# Patient Record
Sex: MALE | Race: WHITE | NOT HISPANIC OR LATINO
[De-identification: names, ages, dates, MRNs, and addresses within clinical notes are randomized per-mention and may not be internally consistent; named-entity substitution may affect disease eponyms.]

---

## 2023-02-24 PROBLEM — Z00.00 ENCOUNTER FOR PREVENTIVE HEALTH EXAMINATION: Status: ACTIVE | Noted: 2023-02-24

## 2023-02-27 ENCOUNTER — RESULT CHARGE (OUTPATIENT)
Age: 73
End: 2023-02-27

## 2023-02-27 ENCOUNTER — APPOINTMENT (OUTPATIENT)
Dept: CARDIOLOGY | Facility: CLINIC | Age: 73
End: 2023-02-27
Payer: MEDICARE

## 2023-02-27 VITALS
HEART RATE: 86 BPM | RESPIRATION RATE: 14 BRPM | DIASTOLIC BLOOD PRESSURE: 84 MMHG | HEIGHT: 68 IN | BODY MASS INDEX: 32.89 KG/M2 | TEMPERATURE: 97.8 F | WEIGHT: 217 LBS | SYSTOLIC BLOOD PRESSURE: 130 MMHG

## 2023-02-27 DIAGNOSIS — Z87.891 PERSONAL HISTORY OF NICOTINE DEPENDENCE: ICD-10-CM

## 2023-02-27 DIAGNOSIS — Z78.9 OTHER SPECIFIED HEALTH STATUS: ICD-10-CM

## 2023-02-27 DIAGNOSIS — Z86.79 PERSONAL HISTORY OF OTHER DISEASES OF THE CIRCULATORY SYSTEM: ICD-10-CM

## 2023-02-27 PROCEDURE — 99204 OFFICE O/P NEW MOD 45 MIN: CPT

## 2023-02-27 PROCEDURE — 93000 ELECTROCARDIOGRAM COMPLETE: CPT

## 2023-02-27 RX ORDER — CHOLECALCIFEROL (VITAMIN D3) 125 MCG
50 MCG TABLET ORAL DAILY
Refills: 0 | Status: ACTIVE | COMMUNITY

## 2023-02-27 RX ORDER — MECLIZINE HYDROCHLORIDE 25 MG/1
25 TABLET ORAL DAILY
Refills: 0 | Status: ACTIVE | COMMUNITY

## 2023-02-27 RX ORDER — LINACLOTIDE 145 UG/1
145 CAPSULE, GELATIN COATED ORAL DAILY
Refills: 0 | Status: ACTIVE | COMMUNITY

## 2023-02-27 RX ORDER — BISACODYL 5 MG/1
5 TABLET, DELAYED RELEASE ORAL DAILY
Refills: 0 | Status: ACTIVE | COMMUNITY

## 2023-02-27 NOTE — REVIEW OF SYSTEMS
[Lower Ext Edema] : lower extremity edema [Negative] : Heme/Lymph [Blurry Vision] : no blurred vision [Dyspnea on exertion] : not dyspnea during exertion [Chest Discomfort] : chest discomfort [Palpitations] : no palpitations [Syncope] : no syncope [Anxiety] : no anxiety

## 2023-02-27 NOTE — ASSESSMENT
[FreeTextEntry1] : 72 y.o. male with \par - Abnormal EKG, RBBB.\par - Venous insufficiency/unilateral leg swelling/varicose veins.\par - diminished distal pulses LE.\par \par Plan:\par - Will obtain TTE\par - Venous mapping of the LE, r/o DVT and assess venous insufficiency.\par - will obtain FARNAZ/PVR.\par \par May need a EST in the future.\par \par F/U after testing

## 2023-02-27 NOTE — PHYSICAL EXAM
[Well Developed] : well developed [Well Nourished] : well nourished [No Acute Distress] : no acute distress [Normal S1, S2] : normal S1, S2 [Edema ___] : edema [unfilled] [Venous varicosities] : venous varicosities [Moves all extremities] : moves all extremities [Normal] : alert and oriented, normal memory [Alert and Oriented] : alert and oriented [Diminished Pedal Pulses ___] : diminished pedal pulses [unfilled] [de-identified] : dark discoloration of left leg

## 2023-02-27 NOTE — HISTORY OF PRESENT ILLNESS
[FreeTextEntry1] : 72 y.o. male with PMH of venous insufficiency and varicose veins presents to establish are. He was referred to cardiology by his PSP Dr. Jones for abnormal EKG. Complains of occasional chest discomfort relieved with Validol. Walks on a daily basis, denies any CP while walking. He is on Meclizine PRN for vertigo. Denies dyspnea, palpitations. Cholesterol is at goal. Normotensive at the time of the visit. \par \par Venous insufficiency, diminished pulses. \par Cr 1.3, GFR 58.

## 2023-03-29 ENCOUNTER — APPOINTMENT (OUTPATIENT)
Dept: CARDIOLOGY | Facility: CLINIC | Age: 73
End: 2023-03-29
Payer: MEDICARE

## 2023-03-29 PROCEDURE — 93306 TTE W/DOPPLER COMPLETE: CPT

## 2023-03-31 ENCOUNTER — APPOINTMENT (OUTPATIENT)
Dept: CARDIOLOGY | Facility: CLINIC | Age: 73
End: 2023-03-31
Payer: MEDICARE

## 2023-03-31 PROCEDURE — 93923 UPR/LXTR ART STDY 3+ LVLS: CPT

## 2023-03-31 PROCEDURE — 93970 EXTREMITY STUDY: CPT

## 2023-04-14 ENCOUNTER — APPOINTMENT (OUTPATIENT)
Dept: CARDIOLOGY | Facility: CLINIC | Age: 73
End: 2023-04-14
Payer: MEDICARE

## 2023-04-14 ENCOUNTER — RESULT CHARGE (OUTPATIENT)
Age: 73
End: 2023-04-14

## 2023-04-14 VITALS
DIASTOLIC BLOOD PRESSURE: 88 MMHG | BODY MASS INDEX: 32.43 KG/M2 | RESPIRATION RATE: 14 BRPM | SYSTOLIC BLOOD PRESSURE: 130 MMHG | HEART RATE: 76 BPM | HEIGHT: 68 IN | WEIGHT: 214 LBS

## 2023-04-14 DIAGNOSIS — I87.2 VENOUS INSUFFICIENCY (CHRONIC) (PERIPHERAL): ICD-10-CM

## 2023-04-14 PROCEDURE — 93000 ELECTROCARDIOGRAM COMPLETE: CPT

## 2023-04-14 PROCEDURE — 99213 OFFICE O/P EST LOW 20 MIN: CPT

## 2023-04-14 NOTE — REVIEW OF SYSTEMS
[Chest Discomfort] : chest discomfort [Lower Ext Edema] : lower extremity edema [Negative] : Heme/Lymph [Blurry Vision] : no blurred vision [Dyspnea on exertion] : not dyspnea during exertion [Palpitations] : no palpitations [Syncope] : no syncope [Anxiety] : no anxiety

## 2023-04-14 NOTE — ASSESSMENT
[FreeTextEntry1] : 72 y.o. male \par RBBB. Normal chamber sizes and RV/LV function.\par L Venous insufficiency.\par Episodes of chest discomfort.\par \par Plan:\par Exercise stress test.\par Vascular evaluation if left LE becomes more symptomatic.\par \par Geovanny Segura MD\par \par

## 2023-04-14 NOTE — PHYSICAL EXAM
[Well Developed] : well developed [Well Nourished] : well nourished [No Acute Distress] : no acute distress [Normal S1, S2] : normal S1, S2 [Edema ___] : edema [unfilled] [Venous varicosities] : venous varicosities [Moves all extremities] : moves all extremities [Normal] : alert and oriented, normal memory [Alert and Oriented] : alert and oriented [de-identified] : dark discoloration of left leg

## 2023-05-16 ENCOUNTER — APPOINTMENT (OUTPATIENT)
Dept: CARDIOLOGY | Facility: CLINIC | Age: 73
End: 2023-05-16
Payer: MEDICARE

## 2023-05-16 ENCOUNTER — APPOINTMENT (OUTPATIENT)
Dept: CARDIOLOGY | Facility: CLINIC | Age: 73
End: 2023-05-16

## 2023-05-16 PROCEDURE — 93015 CV STRESS TEST SUPVJ I&R: CPT

## 2023-06-27 ENCOUNTER — APPOINTMENT (OUTPATIENT)
Dept: CARDIOLOGY | Facility: CLINIC | Age: 73
End: 2023-06-27
Payer: MEDICARE

## 2023-06-27 ENCOUNTER — RESULT CHARGE (OUTPATIENT)
Age: 73
End: 2023-06-27

## 2023-06-27 VITALS
BODY MASS INDEX: 33.65 KG/M2 | WEIGHT: 222 LBS | SYSTOLIC BLOOD PRESSURE: 122 MMHG | HEART RATE: 82 BPM | HEIGHT: 68 IN | DIASTOLIC BLOOD PRESSURE: 86 MMHG

## 2023-06-27 DIAGNOSIS — M79.604 PAIN IN RIGHT LEG: ICD-10-CM

## 2023-06-27 DIAGNOSIS — M79.605 PAIN IN RIGHT LEG: ICD-10-CM

## 2023-06-27 DIAGNOSIS — I45.10 UNSPECIFIED RIGHT BUNDLE-BRANCH BLOCK: ICD-10-CM

## 2023-06-27 DIAGNOSIS — R07.89 OTHER CHEST PAIN: ICD-10-CM

## 2023-06-27 PROCEDURE — 93000 ELECTROCARDIOGRAM COMPLETE: CPT

## 2023-06-27 PROCEDURE — 99213 OFFICE O/P EST LOW 20 MIN: CPT

## 2023-06-27 RX ORDER — OMEGA-3/DHA/EPA/FISH OIL 300-1000MG
CAPSULE ORAL EVERY OTHER DAY
Refills: 0 | Status: ACTIVE | COMMUNITY

## 2023-06-27 NOTE — HISTORY OF PRESENT ILLNESS
[FreeTextEntry1] : 73 y.o. male with PMH of venous insufficiency and varicose veins, veritgo on PRN meclizine\par \par Pt presents for a follow up visit. Denies chest pain. or COOLEY. He still walks on a daily basis.\par Still has occasional left leg cramps at night, PCP started him on Magnesium.\par \par 2/2023:\par LDL 71\par Cr 1.3\par GFR 58.\par \par

## 2023-06-27 NOTE — ASSESSMENT
[FreeTextEntry1] : 72 y.o. male \par RBBB. Normal chamber sizes and RV/LV function.\par L Venous insufficiency.\par Episodes of chest discomfort (now resolved). Normal EST.\par \par Plan:\par Low-fat diet.\par Keep feet elevated while sitting. \par Vascular evaluation if left LE becomes more symptomatic.\par F/u in 6 months.\par \par Geovanny Segura MD\par \par

## 2023-06-27 NOTE — CARDIOLOGY SUMMARY
[de-identified] : 6/27/23:\par SR, RBBB. [de-identified] : 05/16/23:\par 7 METs, no ischemia at 79% of MPHR. [de-identified] : 2D ECHO:\par LVEF 62%\par Mild MR\par Mild TR. [de-identified] : FARNAZ/PVR:\par Normal.\par LE venous:\par No DVT\par Left distal femoral vein insufficiency with reflux of 2 seconds.

## 2023-06-27 NOTE — PHYSICAL EXAM
[Well Developed] : well developed [Well Nourished] : well nourished [No Acute Distress] : no acute distress [Normal S1, S2] : normal S1, S2 [Edema ___] : edema [unfilled] [Venous varicosities] : venous varicosities [Moves all extremities] : moves all extremities [Normal] : alert and oriented, normal memory [Alert and Oriented] : alert and oriented [de-identified] : dark discoloration of left leg

## 2023-12-28 ENCOUNTER — APPOINTMENT (OUTPATIENT)
Dept: CARDIOLOGY | Facility: CLINIC | Age: 73
End: 2023-12-28